# Patient Record
Sex: FEMALE | Race: WHITE
[De-identification: names, ages, dates, MRNs, and addresses within clinical notes are randomized per-mention and may not be internally consistent; named-entity substitution may affect disease eponyms.]

---

## 2018-04-05 ENCOUNTER — HOSPITAL ENCOUNTER (OUTPATIENT)
Dept: HOSPITAL 89 - ZZGRANDUC | Age: 23
End: 2018-04-05
Attending: PHYSICIAN ASSISTANT
Payer: MEDICAID

## 2018-04-05 VITALS — BODY MASS INDEX: 23.6 KG/M2

## 2018-04-05 DIAGNOSIS — R10.9: Primary | ICD-10-CM

## 2018-04-05 LAB — PLATELET COUNT, AUTOMATED: 286 K/UL (ref 150–450)

## 2018-04-05 PROCEDURE — 84450 TRANSFERASE (AST) (SGOT): CPT

## 2018-04-05 PROCEDURE — 82565 ASSAY OF CREATININE: CPT

## 2018-04-05 PROCEDURE — 84132 ASSAY OF SERUM POTASSIUM: CPT

## 2018-04-05 PROCEDURE — 82947 ASSAY GLUCOSE BLOOD QUANT: CPT

## 2018-04-05 PROCEDURE — 84155 ASSAY OF PROTEIN SERUM: CPT

## 2018-04-05 PROCEDURE — 82040 ASSAY OF SERUM ALBUMIN: CPT

## 2018-04-05 PROCEDURE — 84520 ASSAY OF UREA NITROGEN: CPT

## 2018-04-05 PROCEDURE — 82310 ASSAY OF CALCIUM: CPT

## 2018-04-05 PROCEDURE — 84295 ASSAY OF SERUM SODIUM: CPT

## 2018-04-05 PROCEDURE — 84075 ASSAY ALKALINE PHOSPHATASE: CPT

## 2018-04-05 PROCEDURE — 82435 ASSAY OF BLOOD CHLORIDE: CPT

## 2018-04-05 PROCEDURE — 82374 ASSAY BLOOD CARBON DIOXIDE: CPT

## 2018-04-05 PROCEDURE — 85025 COMPLETE CBC W/AUTO DIFF WBC: CPT

## 2018-04-05 PROCEDURE — 82247 BILIRUBIN TOTAL: CPT

## 2018-04-05 PROCEDURE — 84460 ALANINE AMINO (ALT) (SGPT): CPT

## 2018-04-12 ENCOUNTER — HOSPITAL ENCOUNTER (OUTPATIENT)
Dept: HOSPITAL 89 - ZZGRANDUC | Age: 23
End: 2018-04-12
Attending: PHYSICIAN ASSISTANT
Payer: COMMERCIAL

## 2018-04-12 VITALS — BODY MASS INDEX: 23.6 KG/M2

## 2018-04-12 DIAGNOSIS — R19.7: ICD-10-CM

## 2018-04-12 DIAGNOSIS — R10.9: Primary | ICD-10-CM

## 2018-04-12 PROCEDURE — 82274 ASSAY TEST FOR BLOOD FECAL: CPT

## 2018-04-12 PROCEDURE — 87045 FECES CULTURE AEROBIC BACT: CPT

## 2018-04-12 PROCEDURE — 87177 OVA AND PARASITES SMEARS: CPT

## 2018-04-12 PROCEDURE — 83630 LACTOFERRIN FECAL (QUAL): CPT

## 2018-07-07 ENCOUNTER — HOSPITAL ENCOUNTER (EMERGENCY)
Dept: HOSPITAL 89 - ER | Age: 23
Discharge: HOME | End: 2018-07-07
Payer: COMMERCIAL

## 2018-07-07 VITALS — SYSTOLIC BLOOD PRESSURE: 95 MMHG | DIASTOLIC BLOOD PRESSURE: 67 MMHG

## 2018-07-07 VITALS — WEIGHT: 154 LBS | BODY MASS INDEX: 23.6 KG/M2

## 2018-07-07 DIAGNOSIS — Z3A.01: ICD-10-CM

## 2018-07-07 DIAGNOSIS — O20.9: Primary | ICD-10-CM

## 2018-07-07 LAB — PLATELET COUNT, AUTOMATED: 302 K/UL (ref 150–450)

## 2018-07-07 PROCEDURE — 82310 ASSAY OF CALCIUM: CPT

## 2018-07-07 PROCEDURE — 84132 ASSAY OF SERUM POTASSIUM: CPT

## 2018-07-07 PROCEDURE — 84450 TRANSFERASE (AST) (SGOT): CPT

## 2018-07-07 PROCEDURE — 82565 ASSAY OF CREATININE: CPT

## 2018-07-07 PROCEDURE — 82247 BILIRUBIN TOTAL: CPT

## 2018-07-07 PROCEDURE — 84702 CHORIONIC GONADOTROPIN TEST: CPT

## 2018-07-07 PROCEDURE — 81001 URINALYSIS AUTO W/SCOPE: CPT

## 2018-07-07 PROCEDURE — 82040 ASSAY OF SERUM ALBUMIN: CPT

## 2018-07-07 PROCEDURE — 76817 TRANSVAGINAL US OBSTETRIC: CPT

## 2018-07-07 PROCEDURE — 82947 ASSAY GLUCOSE BLOOD QUANT: CPT

## 2018-07-07 PROCEDURE — 84075 ASSAY ALKALINE PHOSPHATASE: CPT

## 2018-07-07 PROCEDURE — 84295 ASSAY OF SERUM SODIUM: CPT

## 2018-07-07 PROCEDURE — 99284 EMERGENCY DEPT VISIT MOD MDM: CPT

## 2018-07-07 PROCEDURE — 85025 COMPLETE CBC W/AUTO DIFF WBC: CPT

## 2018-07-07 PROCEDURE — 84155 ASSAY OF PROTEIN SERUM: CPT

## 2018-07-07 PROCEDURE — 82435 ASSAY OF BLOOD CHLORIDE: CPT

## 2018-07-07 PROCEDURE — 84460 ALANINE AMINO (ALT) (SGPT): CPT

## 2018-07-07 PROCEDURE — 82374 ASSAY BLOOD CARBON DIOXIDE: CPT

## 2018-07-07 PROCEDURE — 84520 ASSAY OF UREA NITROGEN: CPT

## 2018-07-07 NOTE — RADIOLOGY IMAGING REPORT
FACILITY: [facility] 

 

PATIENT NAME: Linda Samaniego

: 1995

MR: [id number]

V: [id number]

EXAM DATE: 

ORDERING PHYSICIAN: GWENDOLYN PALMER

TECHNOLOGIST: 

 

[facility]

Patient: Linda Samaniego

: 1995

MRN: [id number]

Visit/Account:[id number]

Date of Sevice:  2018

 

ACCESSION #: [id number]

 

OB Ultrasound < 14 weeks

 

Additional Pertinent history: Vaginal bleeding. Early pregnancy.

 

COMPARISON STUDIES:   None available

 

FINDINGS:

Gestational sac: intrauterine and unremarkable

Yolk sac: Visualized

Fetal pole: Visualized

Fetal cardiac activity: Fetal heart tones were identified but were unable to be captured on M-mode.

Estimated gestational age: 5 weeks and 6 days based on average Crown-rump length 2.3 mm.

GIOVANNI: 2019 by clinical and 3/1/2018 by ultrasound.

Subchorionic hemorrhage: none

 

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 The gestational sac is at the proximal po
rtion of the bicornuate in the midline, the mid endometrium. There appears to be a vaginal cyst measu
ring 1 cm.

Maternal ovaries: Right ovary is normal with normal blood flow. Left ovary was not visualized.

Adnexa: No adnexal mass lesion or focal abnormality. There may be mildly increased pelvic vasculature
 present.

Free pelvic fluid: none

 

IMPRESSION:

1. There is intrauterine pregnancy. The pregnancy is in the mid endometrium just at the dividing a po
rtion of the bicornuate appearance of the uterus. A fetal heart tones could be visualized however can
not be obtained by M-mode. This is most likely due to very early pregnancy. However suggest close fol
low-up to assess for early viable pregnancy.

2. Right ovary is normal. Left ovary could not be visualized.

3. There may be mild increase in pelvic vasculature which is nonspecific but could be due to pelvic c
ongestion syndrome.

 

 

I called report to GWENDOLYN PALMER at 2018 4:41 PM.

 

Report Dictated By: Josh Cox at 2018 4:35 PM

 

Report E-Signed By: Josh Cox  at 2018 4:42 PM

 

WSN:[id number]

## 2018-07-07 NOTE — ER REPORT
History and Physical


Time Seen By MD:  14:22


Hx. of Stated Complaint:  


pt is approximately 4-5 weeks pregnant, vaginal bleeding started 45 min pta


HPI/ROS


CHIEF COMPLAINT: 1st trimester bleeding





HISTORY OF PRESENT ILLNESS: Patient is a 23-year-old female who presents to ED 

with complaint of vaginal bleeding that she 1st noticed about 40 minutes ago. 

She states that it was just a small amount of blood with no clots or tissue 

noted. She states that she is 5-6 weeks pregnant. She denies any vaginal 

discharge. She has not noted any pelvic pain. She states that she had a few 

seconds of sharp right lower abdominal pain but since has resolved. She denies 

any nausea or vomiting. She denies any fever. She is .





REVIEW OF SYSTEMS:


Constitutional: No fever, no chills.


Cardiovascular: No chest pain, no palpitations.


Respiratory: No cough, no shortness of breath.


Gastrointestinal: No abdominal pain, no vomiting.


Genitourinary: No hematuria.


Musculoskeletal: No back pain.


Skin: No rashes.


Neurological: No headache.


Allergies:  


Coded Allergies:  


     Sulfa (Sulfonamide Antibiotics) (Verified  Allergy, Severe, ANAPHYLAXIS, 18)


     latex (Verified  Allergy, Intermediate, RASH, 18)


 epidermal scaling and bleeding


Home Meds


Reported Medications


Ferrous Sulfate, Dried (IRON) 159 Mg Tablet.er, 159 MG PO DAILY


   10/12/15


Discontinued Scripts


Hydrocodone Bit/Acetaminophen (HYDROCODON-ACETAMINOPHEN 5-325) 1 Each Tablet, 1 

EACH PO Q4-6H Y for PAIN, #30 TAB


   Prov:TORI ENRIQUEZ MD         17


Ketorolac Tromethamine (KETOROLAC TROMETHAMINE) 10 Mg Tab, 10 MG PO Q6H, #20 TAB


   Prov:TORI ENRIQUEZ MD         17


Promethazine Hcl (PROMETHAZINE HCL) 25 Mg Tablet, 25 MG PO Q8H Y for NAUSEA/

VOMITING, #12 TAB


   Prov:OCHOA CUNHA         17


Reviewed Nurses Notes:  Yes


Old Medical Records Reviewed:  Yes


Hx Smoking:  Yes (2 1/2 yrs- 1 pack would last week and a half)


Smoking Status:  Former Smoker


Exposure to Second Hand Smoke?:  Yes


Hx Substance Use Disorder:  No


Hx Alcohol Use:  No


Constitutional





Vital Sign - Last 24 Hours








 18





 14:44


 


Temp 98.4


 


Pulse 94


 


Resp 16


 


B/P (MAP) 118/71


 


Pulse Ox 100


 


O2 Delivery Room Air








Physical Exam


   General Appearance: The patient is alert, has no immediate need for airway 

protection and no signs of toxicity. Patient appears to be in no acute distress.


Eyes: Pupils equal and round no pallor or injection.


ENT, Mouth: Mucous membranes are moist.


Respiratory: There are no retractions, lungs are clear to auscultation.


Cardiovascular: Regular rate and rhythm. 


Gastrointestinal:  Abdomen is soft and non tender, no masses, bowel sounds 

normal.


Pelvic exam:  The vulva was normal no lesions.  The vagina did not have 

significant discharge.  The cervix was closed no bleeding and no purulent 

drainage.  The uterus was normal size and non tender.  The adnexa had no masses 

and no tenderness.  The exam was performed with a chaperone.


Skin: Warm and dry, no rashes.


Musculoskeletal:  Neck is supple non tender.


      Extremities are nontender, nonswollen and have full range of motion.





DIFFERENTIAL DIAGNOSIS: After history and physical exam differential diagnosis 

was considered for vaginal bleeding including but not limited to ectopic 

pregnancy, menses, miscarriage, and dysfunctional uterine bleeding.





Medical Decision Making


Data Points


Result Diagram:  


18 1500                                                                    

            18 1500





Laboratory





Hematology








Test


  18


15:00 18


15:25


 


Red Blood Count


  4.88 M/uL


(4.17-5.56) 


 


 


Mean Corpuscular Volume


  85.7 fL


(80.0-96.0) 


 


 


Mean Corpuscular Hemoglobin


  29.5 pg


(26.0-33.0) 


 


 


Mean Corpuscular Hemoglobin


Concent 34.4 g/dL


(32.0-36.0) 


 


 


Red Cell Distribution Width


  15.2 %


(11.5-14.5) 


 


 


Mean Platelet Volume


  7.8 fL


(7.2-11.1) 


 


 


Neutrophils (%) (Auto)


  68.4 %


(39.4-72.5) 


 


 


Lymphocytes (%) (Auto)


  23.4 %


(17.6-49.6) 


 


 


Monocytes (%) (Auto)


  5.2 %


(4.1-12.4) 


 


 


Eosinophils (%) (Auto)


  2.2 %


(0.4-6.7) 


 


 


Basophils (%) (Auto)


  0.8 %


(0.3-1.4) 


 


 


Nucleated RBC Relative Count


(auto) 0.0 /100WBC 


  


 


 


Neutrophils # (Auto)


  6.0 K/uL


(2.0-7.4) 


 


 


Lymphocytes # (Auto)


  2.1 K/uL


(1.3-3.6) 


 


 


Monocytes # (Auto)


  0.5 K/uL


(0.3-1.0) 


 


 


Eosinophils # (Auto)


  0.2 K/uL


(0.0-0.5) 


 


 


Basophils # (Auto)


  0.1 K/uL


(0.0-0.1) 


 


 


Nucleated RBC Absolute Count


(auto) 0.00 K/uL 


  


 


 


Sodium Level


  141 mmol/L


(137-145) 


 


 


Potassium Level


  3.5 mmol/L


(3.5-5.0) 


 


 


Chloride Level


  107 mmol/L


() 


 


 


Carbon Dioxide Level


  22 mmol/L


(22-31) 


 


 


Blood Urea Nitrogen 5 mg/dl (7-18)  


 


Creatinine


  0.70 mg/dl


(0.52-1.04) 


 


 


Glomerular Filtration Rate


Calc > 60.0 


  


 


 


Random Glucose


  106 mg/dl


() 


 


 


Calcium Level


  8.8 mg/dl


(8.4-10.2) 


 


 


Total Bilirubin


  1.3 mg/dl


(0.2-1.3) 


 


 


Aspartate Amino Transf


(AST/SGOT) 23 U/L (0-35) 


  


 


 


Alanine Aminotransferase


(ALT/SGPT) 23 U/L (0-56) 


  


 


 


Alkaline Phosphatase 70 U/L (0-126)  


 


Total Protein


  7.2 g/dl


(6.3-8.2) 


 


 


Albumin


  4.0 g/dl


(3.5-5.0) 


 


 


Human Chorionic Gonadotropin,


Quant 50296 mIU/ml 


  


 


 


Urine Color  Yellow 


 


Urine Clarity  Clear 


 


Urine pH


  


  6.0 pH


(4.8-9.5)


 


Urine Specific Gravity  1.010 


 


Urine Protein


  


  Negative mg/dL


(NEGATIVE)


 


Urine Glucose (UA)


  


  Negative mg/dL


(NEGATIVE)


 


Urine Ketones


  


  Negative mg/dL


(NEGATIVE)


 


Urine Blood


  


  Small


(NEGATIVE)


 


Urine Nitrite


  


  Negative


(NEGATIVE)


 


Urine Bilirubin


  


  Negative


(NEGATIVE)


 


Urine Urobilinogen


  


  2.0 mg/dL


(0.2-1.9)


 


Urine Leukocyte Esterase


  


  Negative


(NEGATIVE)


 


Urine RBC


  


  <1 /HPF


(0-2/HPF)


 


Urine WBC


  


  2 /HPF


(0-5/HPF)


 


Urine Squamous Epithelial


Cells 


  Many /LPF


(</=FEW)


 


Urine Transitional Epithelial


Cells 


  Few /LPF


(NONE-FEW)


 


Urine Bacteria


  


  Negative /HPF


(NONE-FEW)


 


Urine Mucus


  


  None /HPF


(NONE-FEW)








Chemistry








Test


  18


15:00 18


15:25


 


White Blood Count


  8.8 k/uL


(4.5-11.0) 


 


 


Red Blood Count


  4.88 M/uL


(4.17-5.56) 


 


 


Hemoglobin


  14.4 g/dL


(12.0-16.0) 


 


 


Hematocrit


  41.8 %


(34.0-47.0) 


 


 


Mean Corpuscular Volume


  85.7 fL


(80.0-96.0) 


 


 


Mean Corpuscular Hemoglobin


  29.5 pg


(26.0-33.0) 


 


 


Mean Corpuscular Hemoglobin


Concent 34.4 g/dL


(32.0-36.0) 


 


 


Red Cell Distribution Width


  15.2 %


(11.5-14.5) 


 


 


Platelet Count


  302 K/uL


(150-450) 


 


 


Mean Platelet Volume


  7.8 fL


(7.2-11.1) 


 


 


Neutrophils (%) (Auto)


  68.4 %


(39.4-72.5) 


 


 


Lymphocytes (%) (Auto)


  23.4 %


(17.6-49.6) 


 


 


Monocytes (%) (Auto)


  5.2 %


(4.1-12.4) 


 


 


Eosinophils (%) (Auto)


  2.2 %


(0.4-6.7) 


 


 


Basophils (%) (Auto)


  0.8 %


(0.3-1.4) 


 


 


Nucleated RBC Relative Count


(auto) 0.0 /100WBC 


  


 


 


Neutrophils # (Auto)


  6.0 K/uL


(2.0-7.4) 


 


 


Lymphocytes # (Auto)


  2.1 K/uL


(1.3-3.6) 


 


 


Monocytes # (Auto)


  0.5 K/uL


(0.3-1.0) 


 


 


Eosinophils # (Auto)


  0.2 K/uL


(0.0-0.5) 


 


 


Basophils # (Auto)


  0.1 K/uL


(0.0-0.1) 


 


 


Nucleated RBC Absolute Count


(auto) 0.00 K/uL 


  


 


 


Glomerular Filtration Rate


Calc > 60.0 


  


 


 


Calcium Level


  8.8 mg/dl


(8.4-10.2) 


 


 


Total Bilirubin


  1.3 mg/dl


(0.2-1.3) 


 


 


Aspartate Amino Transf


(AST/SGOT) 23 U/L (0-35) 


  


 


 


Alanine Aminotransferase


(ALT/SGPT) 23 U/L (0-56) 


  


 


 


Alkaline Phosphatase 70 U/L (0-126)  


 


Total Protein


  7.2 g/dl


(6.3-8.2) 


 


 


Albumin


  4.0 g/dl


(3.5-5.0) 


 


 


Human Chorionic Gonadotropin,


Quant 28454 mIU/ml 


  


 


 


Urine Color  Yellow 


 


Urine Clarity  Clear 


 


Urine pH


  


  6.0 pH


(4.8-9.5)


 


Urine Specific Gravity  1.010 


 


Urine Protein


  


  Negative mg/dL


(NEGATIVE)


 


Urine Glucose (UA)


  


  Negative mg/dL


(NEGATIVE)


 


Urine Ketones


  


  Negative mg/dL


(NEGATIVE)


 


Urine Blood


  


  Small


(NEGATIVE)


 


Urine Nitrite


  


  Negative


(NEGATIVE)


 


Urine Bilirubin


  


  Negative


(NEGATIVE)


 


Urine Urobilinogen


  


  2.0 mg/dL


(0.2-1.9)


 


Urine Leukocyte Esterase


  


  Negative


(NEGATIVE)


 


Urine RBC


  


  <1 /HPF


(0-2/HPF)


 


Urine WBC


  


  2 /HPF


(0-5/HPF)


 


Urine Squamous Epithelial


Cells 


  Many /LPF


(</=FEW)


 


Urine Transitional Epithelial


Cells 


  Few /LPF


(NONE-FEW)


 


Urine Bacteria


  


  Negative /HPF


(NONE-FEW)


 


Urine Mucus


  


  None /HPF


(NONE-FEW)








Urinalysis








Test


  18


15:25


 


Urine Color Yellow 


 


Urine Clarity Clear 


 


Urine pH


  6.0 pH


(4.8-9.5)


 


Urine Specific Gravity 1.010 


 


Urine Protein


  Negative mg/dL


(NEGATIVE)


 


Urine Glucose (UA)


  Negative mg/dL


(NEGATIVE)


 


Urine Ketones


  Negative mg/dL


(NEGATIVE)


 


Urine Blood


  Small


(NEGATIVE)


 


Urine Nitrite


  Negative


(NEGATIVE)


 


Urine Bilirubin


  Negative


(NEGATIVE)


 


Urine Urobilinogen


  2.0 mg/dL


(0.2-1.9)


 


Urine Leukocyte Esterase


  Negative


(NEGATIVE)


 


Urine RBC


  <1 /HPF


(0-2/HPF)


 


Urine WBC


  2 /HPF


(0-5/HPF)


 


Urine Squamous Epithelial


Cells Many /LPF


(</=FEW)


 


Urine Transitional Epithelial


Cells Few /LPF


(NONE-FEW)


 


Urine Bacteria


  Negative /HPF


(NONE-FEW)


 


Urine Mucus


  None /HPF


(NONE-FEW)











EKG/Imaging


Imaging


Pelvic US:





IMPRESSION:


1. There is intrauterine pregnancy. The pregnancy is in the mid endometrium 

just at the dividing a portion of the bicornuate appearance of the uterus. A 

fetal heart tones could be visualized however cannot be obtained by M-mode. 

This is most likely due to very early pregnancy. However suggest close follow-

up to assess for early viable pregnancy.


2. Right ovary is normal. Left ovary could not be visualized.


3. There may be mild increase in pelvic vasculature which is nonspecific but 

could be due to pelvic congestion syndrome.


 


 


I called report to GWENDOLYN PALMER at 2018 4:41 PM.


 


Report Dictated By: Josh Cox at 2018 4:35 PM


 


Report E-Signed By: Josh Cox  at 2018 4:42 PM





ED Course/Re-evaluation


ED Course


Will obtain labs and pelvic ultrasound.





- 2018 4:55:52 pm - discussed all labs and imaging with patient. There is 

intrauterine pregnancy noted. No abnormality noted. All labs are essentially 

normal. Her cervical os is closed. Discussed that this could just be a normal 

pregnancy also could be a threatened  given her symptoms thus far 

however seems to be fairly minimal bleeding at this time and no active bleeding 

noted now.


Decision to Disposition Date:  2018


Decision to Disposition Time:  16:56





Depart


Departure


Latest Vital Signs





Vital Signs








  Date Time  Temp Pulse Resp B/P (MAP) Pulse Ox O2 Delivery O2 Flow Rate FiO2


 


18 14:44 98.4 94 16 118/71 100 Room Air  








Impression:  


 Primary Impression:  


 First trimester bleeding


Condition:  Improved


Disposition:  HOME OR SELF-CARE


Patient Instructions:  Dysfunctional Uterine Bleeding (ED), First Trimester 

Pregnancy (ED)





Additional Instructions:  


Stay well-hydrated. Follow-up with your OB in 2-3 days. If having any worsening 

or concerning symptoms may return to the emergency department.











GWENDOLYN PALMER PA-C 2018 16:24

## 2018-07-12 VITALS — DIASTOLIC BLOOD PRESSURE: 64 MMHG | SYSTOLIC BLOOD PRESSURE: 107 MMHG

## 2018-07-12 LAB — PLATELET COUNT, AUTOMATED: 273 K/UL (ref 150–450)

## 2018-07-12 NOTE — ONCOLOGY CONSULTATION
EVENT DATE  

July 12, 2018 



REFERRING PHYSICIAN 

VIVEK Yarbrough



REASON FOR CONSULTATION 

Evaluation and management of high vitamin B12.  



HISTORY OF PRESENT ILLNESS 

Patient is a 23-year-old female with insignificant past medical history who was 
found on multiple occasions to have high vitamin B12 level.  Her B12 level on 
May 23, 2018 was 1474, and on May 23, 2018 it was 1516.  As per patient, she 
was on multivitamins which were stopped around two years ago.  Patient is 
asymptomatic and denies any constitutional symptoms.  



PAST MEDICAL HISTORY 

Insignificant.



PAST SURGICAL HISTORY  

1.  Eye surgery for lazy eye.  

2.  Cholecystectomy on May 30, 2017.  

3.  Appendectomy on May 30, 2017.   



SOCIAL HISTORY 

Patient is single with two children.  She works at the [employer] as 
an .  Denies any abuse of tobacco, alcohol or illicit drugs.  



FAMILY HISTORY 

Maternal great grandmother and maternal grandfather both had cancer, but she 
does not know exactly the type.  



CURRENT MEDICATIONS 

Ferrous sulfate 159 mg tablet daily. 



ALLERGIES 

SULFA, and she does not know exactly the reaction.  



REVIEW OF SYSTEMS

CONSTITUTIONAL:  No appetite or weight change.  No fever, chills or sweating.  
No recent infection.    

HEENT:  

Ears:  No tinnitus or hearing problem.  

Nose:  No nasal discharge or epistaxis.  

Throat:  No sore throat or mouth ulcers.  

Eyes:  No diplopia or visual changes.  

RESPIRATORY:  No shortness of breath.  No cough, expectoration or hemoptysis.  
  

CARDIOVASCULAR:  No chest pain, orthopnea, or paroxysmal nocturnal dyspnea (PND)
.  No edema.  No palpitations.   

GASTROINTESTINAL:  She has occasional nausea or vomiting.  No diarrhea or 
constipation.  No change in bowel movements.  No heartburn or swallowing 
difficulties.  No abdominal pain.  No jaundice.  No hematemesis, melena or 
rectal bleeding.        

GENITOURINARY:  No hematuria or dysuria.    

MUSCULOSKELETAL:  No pain in the muscles, joints or bones.  

NEUROLOGICAL:  No tingling or numbness in the hands or feet.  No headaches or 
convulsions.      

HEMATOLOGIC/LYMPHATIC:  No bleeding.  She bruises easily.  No weakness or 
fatigue.  No enlarged lymph nodes.  

SKIN:  No skin rash or lumps.  

PSYCHIATRIC:  No anxiety or depression. 



PHYSICAL EXAMINATION

GENERAL:  Looks stable.  Well-developed, well-nourished, and in no acute 
distress. 

VITAL SIGNS:  Blood pressure 107/64, pulse 82 per minute, respirations 16 per 
minute, temperature 97.3, pulse ox 96% on room air.

HEENT:  

Head:  Atraumatic.  No sinus tenderness to palpation.  

Eyes:  No icterus or conjunctivitis.  

Mouth and Throat:  No oral thrush or mucositis.    

NECK:  Supple.  No cervical or supraclavicular lymphadenopathy.  

LUNGS:  Clear to auscultation and percussion bilaterally.      

HEART:  Regular rate and rhythm.  No gallops, murmurs, clicks or rubs.

ABDOMEN:  Soft and lax.  No tenderness.  No hepatosplenomegaly.  No masses.     

EXTREMITIES:  No cyanosis, clubbing or edema.    

LYMPHATICS:  No peripheral lymphadenopathy.   

NEUROLOGICAL:  Conscious, alert and oriented times three.  No focal motor or 
sensory deficits.  

PSYCHIATRIC:  Mood and affect appear normal.  

SKIN:  No skin rash, bruise or purpuric eruption. 



ASSESSMENT 

1.  High level of vitamin B12 around 1500.  As vitamin B12 is a water soluble 
vitamin, I do not think it can be deposited in the body, so it will not be 
harmful and excess vitamin B12 would be secreted in the urine.  The patient was 
on multivitamins which were stopped around two years ago, and the stores of 
vitamin B12 may take three years to start to deplete.  There was association 
between myeloproliferative disorder and high vitamin B12, and for this reason I 
am planning to check her CBC to see if there will be any problem with that in 
her blood count.  If there will be high white count or platelet or red blood 
cells, then I will investigate for myeloproliferative disorder, but if the CBC 
comes back normal, no further workup will be required.  I will repeat her 
vitamin B12 level and I will also check her methylmalonic acid assay.  I 
explained that to the patient and she is agreeable with the plan of management.
  I am planning to see her in a week for further evaluation and management.   



PLAN 

1.  CBC. 

2.  Vitamin B12 level. 

3.  Methylmalonic acid assay.  

4.  Patient to return in one week for further evaluation and management.  

5.  Patient is to contact us for any new concern or complaints. 

TONED

## 2018-07-27 ENCOUNTER — HOSPITAL ENCOUNTER (OUTPATIENT)
Dept: HOSPITAL 89 - ONC | Age: 23
LOS: 35 days | Discharge: HOME | End: 2018-08-31
Attending: INTERNAL MEDICINE
Payer: COMMERCIAL

## 2018-07-27 VITALS — SYSTOLIC BLOOD PRESSURE: 105 MMHG | DIASTOLIC BLOOD PRESSURE: 65 MMHG

## 2018-07-27 VITALS — WEIGHT: 167.33 LBS | BODY MASS INDEX: 23.6 KG/M2

## 2018-07-27 DIAGNOSIS — R74.8: Primary | ICD-10-CM

## 2018-07-27 PROCEDURE — 85025 COMPLETE CBC W/AUTO DIFF WBC: CPT

## 2018-07-27 PROCEDURE — 36415 COLL VENOUS BLD VENIPUNCTURE: CPT

## 2018-07-27 PROCEDURE — 82607 VITAMIN B-12: CPT

## 2018-07-27 PROCEDURE — 99212 OFFICE O/P EST SF 10 MIN: CPT

## 2018-07-27 PROCEDURE — 99202 OFFICE O/P NEW SF 15 MIN: CPT

## 2018-07-27 PROCEDURE — 83921 ORGANIC ACID SINGLE QUANT: CPT

## 2018-07-27 NOTE — ONCOLOGY FOLLOW UP NOTE
EVENT DATE:   July 27, 2018 



DIAGNOSIS 

High level of vitamin B12.



CHIEF COMPLAINT 

Patient is here today for followup of her high vitamin B12 level.  



HEMATOLOGY HISTORY 

Patient is a 23-year-old female with insignificant past medical history who was 
found on multiple occasions to have high vitamin B12 level.  Her B12 level on 
May 23, 2018 was 1474, and on May 23, 2018 it was 1516.  As per patient, she 
was on multivitamins which were stopped around two years ago.  Patient is 
asymptomatic and denies any constitutional symptoms.  Repeat CBC showed white 
count 7.8, hemoglobin 13.6, hematocrit 39.7, platelets 273,000.  Vitamin B12 
level was within the normal range at 865, and methylmalonic acid was normal at 
less than 0.10.  



HISTORY OF PRESENT ILLNESS 

The patient is here today for followup of her high vitamin B12 level.  She is 
totally asymptomatic today.  



PAST MEDICAL HISTORY 

Insignificant.



PAST SURGICAL HISTORY  

1.  Eye surgery for lazy eye.  

2.  Cholecystectomy on May 30, 2017.  

3.  Appendectomy on May 30, 2017.   



SOCIAL HISTORY 

Patient is single with two children.  She works at the [employer] as 
an .  Denies any abuse of tobacco, alcohol or illicit drugs.  



FAMILY HISTORY 

Maternal great grandmother and maternal grandfather both had cancer, but she 
does not know exactly the type.  



CURRENT MEDICATIONS 

Ferrous sulfate 159 mg tablet daily. 



ALLERGIES 

SULFA, and she does not know exactly the reaction.  



REVIEW OF SYSTEMS

CONSTITUTIONAL:  No appetite or weight change.  No fever, chills or sweating.  
No recent infection.    

HEENT:  

Ears:  No tinnitus or hearing problem.  

Nose:  No nasal discharge or epistaxis.  

Throat:  No sore throat or mouth ulcers.  

Eyes:  No diplopia or visual changes.  

RESPIRATORY:  No shortness of breath.  No cough, expectoration or hemoptysis.  
  

CARDIOVASCULAR:  No chest pain, orthopnea, or paroxysmal nocturnal dyspnea (PND)
.  No edema.  No palpitations.   

GASTROINTESTINAL:  She has occasional nausea or vomiting.  No diarrhea or 
constipation.  No change in bowel movements.  No heartburn or swallowing 
difficulties.  No abdominal pain.  No jaundice.  No hematemesis, melena or 
rectal bleeding.        

GENITOURINARY:  No hematuria or dysuria.    

MUSCULOSKELETAL:  No pain in the muscles, joints or bones.  

NEUROLOGICAL:  No tingling or numbness in the hands or feet.  No headaches or 
convulsions.      

HEMATOLOGIC/LYMPHATIC:  No bleeding.  She bruises easily.  No weakness or 
fatigue.  No enlarged lymph nodes.  

SKIN:  No skin rash or lumps.  

PSYCHIATRIC:  No anxiety or depression. 



PHYSICAL EXAMINATION

GENERAL:  Looks stable.  Well-developed, well-nourished, and in no acute 
distress. 

VITAL SIGNS:  Blood pressure 105/65, pulse 85 per minute, respirations 16 per 
minute, temperature 98.6, pulse ox 98% on room air.

HEENT:  

Head:  Atraumatic.  No sinus tenderness to palpation.  

Eyes:  No icterus or conjunctivitis.  

Mouth and Throat:  No oral thrush or mucositis.    

NECK:  Supple.  No cervical or supraclavicular lymphadenopathy.  

LUNGS:  Clear to auscultation and percussion bilaterally.      

HEART:  Regular rate and rhythm.  No gallops, murmurs, clicks or rubs.

ABDOMEN:  Soft and lax.  No tenderness.  No hepatosplenomegaly.  No masses.     

EXTREMITIES:  No cyanosis, clubbing or edema.    

LYMPHATICS:  No peripheral lymphadenopathy.   

NEUROLOGICAL:  Conscious, alert and oriented times three.  No focal motor or 
sensory deficits.  

PSYCHIATRIC:  Mood and affect appear normal.  

SKIN:  No skin rash, bruise or purpuric eruption. 



DIAGNOSTIC DATA 

CBC showed white count 7.8, hemoglobin 13.6, hematocrit 39.7, platelets 273,
000.  Vitamin B12 was 865.  Methylmalonic acid less than 0.10.  



ASSESSMENT 

High level of vitamin B12 around 1500.  Repeat B12 level came back normal at 
865 and methylmalonic acid assay was normal at less than 0.10.  Her CBC was 
totally normal with white count 7.8, hemoglobin 13.6 and platelet count 273,
000.  No further workup is required.   I am planning to refer the patient back 
to her primary care provider.  I will be more than happy to see her in the 
future for any hemorrhoidal abnormalities.  



PLAN 

1.  Patient to continue followup with her primary care provider.  

2.  I will be more than happy to see the patient in the future if she will 
develop any hematological abnormalities. 

3.  Patient is to contact us for any new concern or complaints. 





SKYLA

## 2018-12-06 ENCOUNTER — HOSPITAL ENCOUNTER (EMERGENCY)
Dept: HOSPITAL 89 - ER | Age: 23
Discharge: HOME | End: 2018-12-06
Payer: COMMERCIAL

## 2018-12-06 VITALS — WEIGHT: 194 LBS | BODY MASS INDEX: 23.6 KG/M2

## 2018-12-06 VITALS — SYSTOLIC BLOOD PRESSURE: 96 MMHG | DIASTOLIC BLOOD PRESSURE: 68 MMHG

## 2018-12-06 DIAGNOSIS — K64.5: Primary | ICD-10-CM

## 2018-12-06 PROCEDURE — 99283 EMERGENCY DEPT VISIT LOW MDM: CPT

## 2018-12-06 NOTE — ER REPORT
History and Physical


Time Seen By MD:  18:23


Hx. of Stated Complaint:  


Patient with bump near rectum.  States started today and it hurts to sit


HPI/ROS


CHIEF COMPLAINT: swelling and pain at rectum





HISTORY OF PRESENT ILLNESS: This is a 23 year old female. She is having pain in 


the rectal area with an area of pain and swelling. Hurts to sit. No drainage. No


fevers or chills. She is pregnant.


Allergies:  


Coded Allergies:  


     Sulfa (Sulfonamide Antibiotics) (Verified  Allergy, Severe, ANAPHYLAXIS, 


12/6/18)


     latex (Verified  Allergy, Intermediate, RASH, 12/6/18)


   


   epidermal scaling and bleeding


Home Meds


Reported Medications


Prenatal Vits W-Ca,Fe,Fa(<1MG) (PRENATAL VITAMINS) 1 Each Tablet, 1 EACH PO 


DAILY, TAB


   12/6/18


Discontinued Reported Medications


Ferrous Sulfate, Dried (IRON) 159 Mg Tablet.er, 159 MG PO DAILY


   10/12/15


Reviewed Nurses Notes:  Yes


Hx Smoking:  Yes (2 1/2 yrs- 1 pack would last week and a half)


Smoking Status:  Former Smoker


Exposure to Second Hand Smoke?:  Yes


Hx Substance Use Disorder:  No


Hx Alcohol Use:  No


Constitutional





Vital Sign - Last 24 Hours








 12/6/18 12/6/18





 18:18 18:45


 


Temp 98.1 


 


Pulse 99 90


 


Resp 16 16


 


B/P (MAP) 131/83 96/68 (77)


 


Pulse Ox 96 96


 


O2 Delivery Room Air 








Physical Exam


General: Alert, anxious.


Rectum: Has a thrombosed hemorrhoid which is swollen and painful. No other 


problems noted.





Medical Decision Making


ED Course/Re-evaluation


ED Course





Procedure:  Evacuation of thrombosed external hemorrhoid.





The patient has a painful thrombosed external hemorrhoid. Based on the patient's


significant pain from hemorrhoid we elected to evacuate the thrombosis. I 


informed the patient of potential complications including infection and bleeding


and the patient agreed to the procedure. The patient was placed on their side. 


The area was prepped with betadyne and anesthetized with lidocaine and 


epinephrine. An incision was made in the thrombosed hemorrhoid and the 


thrombosis was evacuated. A dressing was applied. There were no complications.  


The procedure was performed by myself.


Decision to Disposition Date:  Dec 6, 2018


Decision to Disposition Time:  18:38





Depart


Departure


Latest Vital Signs





Vital Signs








  Date Time  Temp Pulse Resp B/P (MAP) Pulse Ox O2 Delivery O2 Flow Rate FiO2


 


12/6/18 18:45  90 16 96/68 (77) 96   


 


12/6/18 18:18 98.1     Room Air  








Impression:  


   Primary Impression:  


   External thrombosed hemorrhoids


Condition:  Improved


Disposition:  HOME OR SELF-CARE


Patient Instructions:  Thrombosed Hemorrhoid (ED)





Additional Instructions:  


Keep the area clean.


Several times a day for the next few days, you can sit in a hot/warm bath and 


soak the area for at least 30 minutes.


After bowel movements, you can do soaks, or use a soft wet wipe to clean the 


area.


For inflammation, itching or pain, you can use some Preparation H or simple 


Hydrocortisone cream. Both are available over the counter.


Return for any increasing pain, swelling or fevers/chills.











SAVANA DAN MD              Dec 6, 2018 18:23